# Patient Record
Sex: MALE | Race: BLACK OR AFRICAN AMERICAN | NOT HISPANIC OR LATINO | Employment: UNEMPLOYED | URBAN - METROPOLITAN AREA
[De-identification: names, ages, dates, MRNs, and addresses within clinical notes are randomized per-mention and may not be internally consistent; named-entity substitution may affect disease eponyms.]

---

## 2017-11-13 ENCOUNTER — HOSPITAL ENCOUNTER (OUTPATIENT)
Dept: RADIOLOGY | Facility: HOSPITAL | Age: 37
Discharge: HOME | End: 2017-11-13
Payer: COMMERCIAL

## 2017-11-13 ENCOUNTER — OFFICE VISIT (OUTPATIENT)
Dept: FAMILY MEDICINE | Facility: HOSPITAL | Age: 37
End: 2017-11-13
Attending: FAMILY MEDICINE
Payer: COMMERCIAL

## 2017-11-13 VITALS
SYSTOLIC BLOOD PRESSURE: 114 MMHG | WEIGHT: 164.69 LBS | HEART RATE: 68 BPM | BODY MASS INDEX: 21.83 KG/M2 | RESPIRATION RATE: 20 BRPM | DIASTOLIC BLOOD PRESSURE: 71 MMHG | HEIGHT: 73 IN
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
## 2017-11-13 DIAGNOSIS — M54.50 ACUTE BILATERAL LOW BACK PAIN WITHOUT SCIATICA: ICD-10-CM

## 2017-11-13 DIAGNOSIS — Z00.00 HEALTHCARE MAINTENANCE: Primary | ICD-10-CM

## 2017-11-13 PROCEDURE — 99214 OFFICE O/P EST MOD 30 MIN: CPT | Mod: 25

## 2017-11-13 PROCEDURE — 72110 X-RAY EXAM L-2 SPINE 4/>VWS: CPT | Mod: TC

## 2017-11-13 PROCEDURE — 72110 X-RAY EXAM L-2 SPINE 4/>VWS: CPT | Mod: 26,,, | Performed by: RADIOLOGY

## 2017-11-13 RX ORDER — CYCLOBENZAPRINE HCL 5 MG
5 TABLET ORAL 3 TIMES DAILY PRN
Qty: 30 TABLET | Refills: 0 | Status: CANCELLED | OUTPATIENT
Start: 2017-11-13 | End: 2017-11-23

## 2017-11-13 RX ORDER — NAPROXEN 500 MG/1
500 TABLET ORAL 2 TIMES DAILY WITH MEALS
Qty: 28 TABLET | Refills: 0 | Status: SHIPPED | OUTPATIENT
Start: 2017-11-13 | End: 2017-11-27
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
## 2017-11-14 NOTE — PROGRESS NOTES
I assume primary medical responsibility for this patient, I have reviewed the case history, findings, diagnosis and treatment plan with the resident and agree that the care is reasonable and necessary. This service has been performed by a resident without the presence of a teaching physician under the primary care exception  Agueda Leigh  11/14/2017

## 2017-12-05 ENCOUNTER — CLINICAL SUPPORT (OUTPATIENT)
Dept: REHABILITATION | Facility: HOSPITAL | Age: 37
End: 2017-12-05
Payer: COMMERCIAL

## 2017-12-05 DIAGNOSIS — M25.60 DECREASED RANGE OF MOTION: ICD-10-CM

## 2017-12-05 DIAGNOSIS — M54.50 CHRONIC MIDLINE LOW BACK PAIN WITHOUT SCIATICA: ICD-10-CM

## 2017-12-05 DIAGNOSIS — R53.1 DECREASED STRENGTH: ICD-10-CM

## 2017-12-05 DIAGNOSIS — G89.29 CHRONIC MIDLINE LOW BACK PAIN WITHOUT SCIATICA: ICD-10-CM

## 2017-12-05 DIAGNOSIS — R26.89 DECREASED FUNCTIONAL MOBILITY: ICD-10-CM

## 2017-12-05 PROCEDURE — 97161 PT EVAL LOW COMPLEX 20 MIN: CPT | Mod: PN
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
## 2017-12-19 ENCOUNTER — CLINICAL SUPPORT (OUTPATIENT)
Dept: REHABILITATION | Facility: HOSPITAL | Age: 37
End: 2017-12-19
Payer: COMMERCIAL

## 2017-12-19 DIAGNOSIS — G89.29 CHRONIC MIDLINE LOW BACK PAIN WITHOUT SCIATICA: ICD-10-CM

## 2017-12-19 DIAGNOSIS — R26.89 DECREASED FUNCTIONAL MOBILITY: ICD-10-CM

## 2017-12-19 DIAGNOSIS — R53.1 DECREASED STRENGTH: ICD-10-CM

## 2017-12-19 DIAGNOSIS — M54.50 CHRONIC MIDLINE LOW BACK PAIN WITHOUT SCIATICA: ICD-10-CM

## 2017-12-19 DIAGNOSIS — M25.60 DECREASED RANGE OF MOTION: ICD-10-CM

## 2017-12-19 PROCEDURE — 97110 THERAPEUTIC EXERCISES: CPT | Mod: PN

## 2017-12-19 NOTE — PROGRESS NOTES
"TIME RECORD    Date: 12/19/2017      Start Time:  4:00  Stop Time:  5:00    PROCEDURES:    TIMED  Procedure Min.   TE  30   TE supervised 25"                 UNTIMED  Procedure Min.             Total Timed Minutes:  60  Total Timed Units:  4  Total Untimed Units:  0  Charges Billed/# of units:  TE 2      Progress/Current Status    Subjective:     Patient ID: Rufino Amaral is a 37 y.o. male.  Diagnosis:   1. Decreased range of motion     2. Decreased strength     3. Decreased functional mobility     4. Chronic midline low back pain without sciatica       Pain: 0 /10  Reports partial HEP compliance    Objective:   Treatment initiated with TE per log 30 minutes f/b Sup TE 25"    Date 12/19/17   Visit 2   Gcode    FOTO    Cap visit  Cap total    MHP    MT    Stretches: HSS w/strap  Piriformis  Prone quad     3x30" B    3x30" B  3x30" B   Supine:    TAs 5" x10   Reverse crunches 2  w/SB   LTR 2' w/SB   March 2x10   Clamshell  2x10 GTB   Iso Add w/ball 2x10 w/5"hold   Bug    Bridge    SLR w/TA 2x10   Prone:    Alt LE    Alt LE/UE    Seated:    TAs    March    LAQs    Lats    Rows    UBE    Leg Press      Initials MB 1/6       Assessment:     Tolerated treatment well.     Patient Education/Response:     Verbalized understanding and demonstrated good technique following instruction    Plans and Goals:     Initial Assessment (Pertinent finding, problem list and factors affecting outcome): Pt is a 36 yo male presenting to PT with pain, decreased lumbar ROM, decreased strength, poor posture, impaired balance and gait, and functional deficits with walking. Pt would benefit from skilled PT consisting of manual therapy including STM/MFR lumbar paraspinals, QL, gluteal region/piriformis, ITB; therapeutic exercise including LE strengthening/stretching, core strengthening/stabilization, postural education, balance and gait training, and modalities prn to address limitations and increase functional mobility.              " "  History  Co-morbidities and personal factors that may impact the plan of care Examination  Body Structures and Functions, activity limitations and participation restrictions that may impact the plan of care   Clinical Presentation   Co-morbidities:               Personal Factors:   no deficits Body Regions:   head  neck  back  lower extremities  upper extremities  trunk     Body Systems:    gross symmetry  ROM  strength  balance  gait  transfers                 Participation Restrictions:   Unable to lift heavy objects at work    Activity limitations:   Learning and applying knowledge  no deficits     General Tasks and Commands  no deficits     Communication  no deficits     Mobility  lifting and carrying objects     Self care  no deficits     Domestic Life  no deficits     Interactions/Relationships  no deficits     Life Areas  no deficits     Community and Social Life  no deficits             stable and uncomplicated                                low   low   high Decision Making/ Complexity Score:  low            Rehab Potiential: good  Barriers to Rehab: none     Short Term Goals (4 Weeks): 1/5/18  1. Pt will be compliant with HEP to supplement PT in decreasing pain with functional mobility.  2. Pt will perform and hold TA contraction for 10x10" in dynamic sitting activities to demonstrate improved core strength  3. Pt will improve lumbar ROM to min loss in all planes to improve functional mobility.  4. Pt will improve impaired LE MMTs to >/=4/5 to improve strength for functional tasks.     Long Term Goals (8 Weeks): 2/5/18  1. Pt will improve FOTO score to </= 29% limited to decrease perceived limitation with maintaining/changing body position  2. Pt will improve B 9090 HS length to min decreased to improve flexibility for normal movement patterns.   3. Pt will perform and hold TA contraction for 10x10" in dynamic standing activities to demonstrate improved core strength  4. Pt will improve impaired LE MMTs " to >/=4+/5 to improve strength for functional tasks.  5. Pt will report pain </= 2/10 during lifting activities to promote functional QOL.  6. Pt will report pain </= 2/10 during lumbar ROM to promote functional mobility.             Certification Period: 12/5/17 to 2/5/18  Recommended Treatment Plan: 2 times per week for 8 weeks: Gait Training, Group Therapy, Locomotor Training, Manual Therapy, Moist Heat/ Ice, Neuromuscular Re-ed, Patient Education, Therapeutic Activites and Therapeutic Exercise  Other Recommendations: modalities prn, ASTYM prn, kinesiotape prn, Functional Dry Needling prn

## 2017-12-26 ENCOUNTER — CLINICAL SUPPORT (OUTPATIENT)
Dept: REHABILITATION | Facility: HOSPITAL | Age: 37
End: 2017-12-26
Payer: COMMERCIAL

## 2017-12-26 DIAGNOSIS — M54.50 CHRONIC MIDLINE LOW BACK PAIN WITHOUT SCIATICA: ICD-10-CM

## 2017-12-26 DIAGNOSIS — R53.1 DECREASED STRENGTH: ICD-10-CM

## 2017-12-26 DIAGNOSIS — G89.29 CHRONIC MIDLINE LOW BACK PAIN WITHOUT SCIATICA: ICD-10-CM

## 2017-12-26 DIAGNOSIS — R26.89 DECREASED FUNCTIONAL MOBILITY: ICD-10-CM

## 2017-12-26 DIAGNOSIS — M25.60 DECREASED RANGE OF MOTION: ICD-10-CM

## 2017-12-26 PROCEDURE — 97110 THERAPEUTIC EXERCISES: CPT | Mod: PN

## 2017-12-26 NOTE — PROGRESS NOTES
"TIME RECORD    Date: 12/26/2017      Start Time:  4:10  Stop Time:  5:00    PROCEDURES:    TIMED  Procedure Min.   TE  50   TE supervised                  UNTIMED  Procedure Min.             Total Timed Minutes:  50  Total Timed Units:  3  Total Untimed Units:  0  Charges Billed/# of units:  TE 3      Progress/Current Status    Subjective:     Patient ID: Rufino Amaral is a 37 y.o. male.  Diagnosis:   1. Decreased range of motion     2. Decreased strength     3. Decreased functional mobility     4. Chronic midline low back pain without sciatica       Pain: 4-5/10  Pt reports that his pain is more annoying and that it depends of the movements he makes.    Objective:   Treatment initiated with TE per log below 1:1 with PT x 50 mins.    Date 12/26/17 12/19/17   Visit 3 2   FOTO 3/5    MHP     MT     Stretches: HSS w/strap  Piriformis  Prone quad     3x30'' B    3x30'' B     3x30" B    3x30" B  3x30" B   Supine:     TAs 5''x10 5" x10   Reverse crunches 2' w/SB 2  w/SB   LTR 2' w/SB 2' w/SB   March 2x10 w/ TA 2x10   Clamshell  2x10 GTB 2x10 GTB   Iso Add w/ball 15x5'' 2x10 w/5"hold   Bug     Bridge 2x15    SLR w/TA 2x10 2x10   Prone:     Alt LE     Alt LE/UE     Seated:     TAs     March     LAQs     Lats Next    Rows Next    UBE     Leg Press 2x10 DL  5 plates      Initials JL MB 1/6       Assessment:     Pt reported minimal increase to no increase in low back pain during Te, and tolerated added TE bridges and leg press well today. Pt reported 3-4/10 low back pain at end of session that is still more activity related.    Patient Education/Response:     Verbalized understanding and demonstrated good technique following instruction    Plans and Goals:   Cont per POC, progress per pt tolerance    Short Term Goals (4 Weeks): 1/5/18  1. Pt will be compliant with HEP to supplement PT in decreasing pain with functional mobility.  2. Pt will perform and hold TA contraction for 10x10" in dynamic sitting activities to " "demonstrate improved core strength  3. Pt will improve lumbar ROM to min loss in all planes to improve functional mobility.  4. Pt will improve impaired LE MMTs to >/=4/5 to improve strength for functional tasks.     Long Term Goals (8 Weeks): 2/5/18  1. Pt will improve FOTO score to </= 29% limited to decrease perceived limitation with maintaining/changing body position  2. Pt will improve B 9090 HS length to min decreased to improve flexibility for normal movement patterns.   3. Pt will perform and hold TA contraction for 10x10" in dynamic standing activities to demonstrate improved core strength  4. Pt will improve impaired LE MMTs to >/=4+/5 to improve strength for functional tasks.  5. Pt will report pain </= 2/10 during lifting activities to promote functional QOL.  6. Pt will report pain </= 2/10 during lumbar ROM to promote functional mobility.    "

## 2017-12-27 ENCOUNTER — DOCUMENTATION ONLY (OUTPATIENT)
Dept: REHABILITATION | Facility: HOSPITAL | Age: 37
End: 2017-12-27

## 2017-12-27 DIAGNOSIS — R53.1 DECREASED STRENGTH: ICD-10-CM

## 2017-12-27 DIAGNOSIS — G89.29 CHRONIC MIDLINE LOW BACK PAIN WITHOUT SCIATICA: ICD-10-CM

## 2017-12-27 DIAGNOSIS — R26.89 DECREASED FUNCTIONAL MOBILITY: ICD-10-CM

## 2017-12-27 DIAGNOSIS — M54.50 CHRONIC MIDLINE LOW BACK PAIN WITHOUT SCIATICA: ICD-10-CM

## 2017-12-27 DIAGNOSIS — M25.60 DECREASED RANGE OF MOTION: ICD-10-CM

## 2017-12-27 NOTE — PROGRESS NOTES
Face to Face PTA Conference performed with Kaykay Aquino PTA, Brunilda Krishnamurthy PTA, John Ha PTA Villa Diaz PTA regarding patient's current status, overall progress, and plan of care    Leslie Gracia, PT     Face to face meeting completed with Leslie Gracia  PT regarding current status and progress of   Rufinokateryna Amaral .  Villa Diaz, PTA     Face to face meeting completed with Leslie Gracia PT regarding current status and progress of   Rufinokateryna Amaral .  Brunilda Krishnamurthy, PTA    Face to face meeting completed with Leslie Gracia PT regarding current status and progress of   Rufinokateryna Amaral .  John Ha PTA      Face to face meeting completed with Leslie Gracia PT regarding current status and progress of   Rufinokateryna Amaral .  Tawana Aquino, PTA

## 2017-12-28 ENCOUNTER — CLINICAL SUPPORT (OUTPATIENT)
Dept: REHABILITATION | Facility: HOSPITAL | Age: 37
End: 2017-12-28
Payer: COMMERCIAL

## 2017-12-28 DIAGNOSIS — M54.50 CHRONIC MIDLINE LOW BACK PAIN WITHOUT SCIATICA: ICD-10-CM

## 2017-12-28 DIAGNOSIS — G89.29 CHRONIC MIDLINE LOW BACK PAIN WITHOUT SCIATICA: ICD-10-CM

## 2017-12-28 DIAGNOSIS — R53.1 DECREASED STRENGTH: ICD-10-CM

## 2017-12-28 DIAGNOSIS — R26.89 DECREASED FUNCTIONAL MOBILITY: ICD-10-CM

## 2017-12-28 DIAGNOSIS — M25.60 DECREASED RANGE OF MOTION: ICD-10-CM

## 2017-12-28 PROCEDURE — 97110 THERAPEUTIC EXERCISES: CPT | Mod: PN

## 2017-12-28 NOTE — PROGRESS NOTES
"TIME RECORD    Date: 12/28/2017      Start Time:  4:00  Stop Time:  4:55    PROCEDURES:    TIMED  Procedure Min.   TE  55   TE supervised                  UNTIMED  Procedure Min.             Total Timed Minutes:  55  Total Timed Units:  4  Total Untimed Units:  0  Charges Billed/# of units:  TE 4      Progress/Current Status    Subjective:     Patient ID: Rufino Amaral is a 37 y.o. male.  Diagnosis:   1. Decreased range of motion     2. Decreased strength     3. Decreased functional mobility     4. Chronic midline low back pain without sciatica       Pain: 0 /10  Pt reports that his pain is more annoying and that it depends of the movements he makes. Expressed that he is making progress.     Objective:   Treatment initiated with TE per log below 1:1 with PT x 50 mins.    Date 12/28/17 12/26/17 12/19/17   Visit 4 3 2   FOTO 4/5 3/5 2/5   MHP      MT      Stretches: HSS w/strap  Piriformis  Prone quad     3x30" B    3x30" B  3x30" B   3x30'' B    3x30'' B     3x30" B    3x30" B  3x30" B   Supine:      TAs 5" x 20 5''x10 5" x10   Reverse crunches 2' w/Gr SB 2' w/SB 2  w/SB   LTR 2' w/ Gr SB 2' w/SB 2' w/SB   March w/TA 2x10 B 2# 2x10 w/ TA 2x10   Clamshell  2x10 BTB 2x10 GTB 2x10 GTB   Iso Add w/ball 5" x20 15x5'' 2x10 w/5"hold   Bug      Bridge 2x15 B 2x15 B    SLR w/TA 2x15 B 2x10 B 2x10   Prone:      Alt LE 2x10 B     Alt LE/UE 2x10 B     Seated:      TAs      March      LAQs      Lats 2x10 BTB Next    Rows 2x15 BTB Next    UBE      Leg Press  2x10 DL  5 plates      Initials MB 1/6 JL MB 1/6       Assessment:     Pt reported no  increase to low back pain during treatment today. Increased reps and resistance per log. . Pt reported  No low back pain at end of session.    Patient Education/Response:     Verbalized understanding and demonstrated good technique following instruction    Plans and Goals:   Cont per POC, progress per pt tolerance    Short Term Goals (4 Weeks): 1/5/18  1. Pt will be compliant with HEP to " "supplement PT in decreasing pain with functional mobility.  2. Pt will perform and hold TA contraction for 10x10" in dynamic sitting activities to demonstrate improved core strength  3. Pt will improve lumbar ROM to min loss in all planes to improve functional mobility.  4. Pt will improve impaired LE MMTs to >/=4/5 to improve strength for functional tasks.     Long Term Goals (8 Weeks): 2/5/18  1. Pt will improve FOTO score to </= 29% limited to decrease perceived limitation with maintaining/changing body position  2. Pt will improve B 9090 HS length to min decreased to improve flexibility for normal movement patterns.   3. Pt will perform and hold TA contraction for 10x10" in dynamic standing activities to demonstrate improved core strength  4. Pt will improve impaired LE MMTs to >/=4+/5 to improve strength for functional tasks.  5. Pt will report pain </= 2/10 during lifting activities to promote functional QOL.  6. Pt will report pain </= 2/10 during lumbar ROM to promote functional mobility.    "

## 2018-01-02 ENCOUNTER — CLINICAL SUPPORT (OUTPATIENT)
Dept: REHABILITATION | Facility: HOSPITAL | Age: 38
End: 2018-01-02
Payer: MEDICAID

## 2018-01-02 DIAGNOSIS — R26.89 DECREASED FUNCTIONAL MOBILITY: ICD-10-CM

## 2018-01-02 DIAGNOSIS — M25.60 DECREASED RANGE OF MOTION: ICD-10-CM

## 2018-01-02 DIAGNOSIS — M54.50 CHRONIC MIDLINE LOW BACK PAIN WITHOUT SCIATICA: ICD-10-CM

## 2018-01-02 DIAGNOSIS — G89.29 CHRONIC MIDLINE LOW BACK PAIN WITHOUT SCIATICA: ICD-10-CM

## 2018-01-02 DIAGNOSIS — R53.1 DECREASED STRENGTH: ICD-10-CM

## 2018-01-02 PROCEDURE — 97110 THERAPEUTIC EXERCISES: CPT | Mod: PN

## 2018-01-02 NOTE — PROGRESS NOTES
"TIME RECORD    Date: 1/2/2018      Start Time:  5:00  Stop Time:  6:00    PROCEDURES:    TIMED  Procedure Min.   TE  55                     UNTIMED  Procedure Min.             Total Timed Minutes:  55  Total Timed Units:  4  Total Untimed Units:  0  Charges Billed/# of units:  TE 4      Progress/Current Status    Subjective:     Patient ID: Rufino Amaral is a 37 y.o. male.  Diagnosis:   1. Decreased range of motion     2. Decreased strength     3. Decreased functional mobility     4. Chronic midline low back pain without sciatica       Pain: 4/10 pt reports pain in B mid lower back described as tingling. Pt reports he has not been doing heavy activity     Objective:   Pt completed all therex as per log below 1:1 with PTA x 55 mins.    Date 1/2/18 12/28/17 12/26/17 12/19/17   Visit 5 4 3 2   FOTO 5/10 4/5 3/5 2/5   MHP       MT       Stretches: HSS w/strap  Piriformis  Prone quad   3x30" B      3x30" B  3x30" B   3x30" B    3x30" B  3x30" B   3x30'' B    3x30'' B     3x30" B    3x30" B  3x30" B   Supine:       TAs 5"x20 5" x 20 5''x10 5" x10   Reverse crunches  2' w/Gr SB 2' w/SB 2  w/SB   LTR 2' w/ Gr SB 2' w/ Gr SB 2' w/SB 2' w/SB   March w/TA  2x10 B 2# 2x10 w/ TA 2x10   Clamshell  2x10 BTB 2x10 BTB 2x10 GTB 2x10 GTB   Iso Add w/ball 5" x20 5" x20 15x5'' 2x10 w/5"hold   Bug       Bridge 2x15 B 2x15 B 2x15 B    SLR w/TA 2x15 B 2x15 B 2x10 B 2x10   Prone:       Alt LE 2x10 B 2x10 B     Alt LE/UE 2x10 B 2x10 B     Seated:       TAs       March       LAQs       Lats 2x10 BTB 2x10 BTB Next    Rows 2x10 BTB 2x15 BTB Next    UBE       Leg Press   2x10 DL  5 plates      Initials JA 2/6 MB 1/6 JL MB 1/6       Assessment:     Pt able to complete session with no reports of increased pain. Instructed patient to become more aware of his posture in standing and sitting position.     Patient Education/Response:     Pt advised to cont HEP within tolerance    Plans and Goals:   Cont per POC, progress per pt tolerance    Short " "Term Goals (4 Weeks): 1/5/18  1. Pt will be compliant with HEP to supplement PT in decreasing pain with functional mobility.  2. Pt will perform and hold TA contraction for 10x10" in dynamic sitting activities to demonstrate improved core strength  3. Pt will improve lumbar ROM to min loss in all planes to improve functional mobility.  4. Pt will improve impaired LE MMTs to >/=4/5 to improve strength for functional tasks.     Long Term Goals (8 Weeks): 2/5/18  1. Pt will improve FOTO score to </= 29% limited to decrease perceived limitation with maintaining/changing body position  2. Pt will improve B 9090 HS length to min decreased to improve flexibility for normal movement patterns.   3. Pt will perform and hold TA contraction for 10x10" in dynamic standing activities to demonstrate improved core strength  4. Pt will improve impaired LE MMTs to >/=4+/5 to improve strength for functional tasks.  5. Pt will report pain </= 2/10 during lifting activities to promote functional QOL.  6. Pt will report pain </= 2/10 during lumbar ROM to promote functional mobility.    "

## 2018-01-04 ENCOUNTER — CLINICAL SUPPORT (OUTPATIENT)
Dept: REHABILITATION | Facility: HOSPITAL | Age: 38
End: 2018-01-04
Payer: MEDICAID

## 2018-01-04 DIAGNOSIS — R53.1 DECREASED STRENGTH: ICD-10-CM

## 2018-01-04 DIAGNOSIS — G89.29 CHRONIC MIDLINE LOW BACK PAIN WITHOUT SCIATICA: ICD-10-CM

## 2018-01-04 DIAGNOSIS — M54.50 CHRONIC MIDLINE LOW BACK PAIN WITHOUT SCIATICA: ICD-10-CM

## 2018-01-04 DIAGNOSIS — M25.60 DECREASED RANGE OF MOTION: ICD-10-CM

## 2018-01-04 DIAGNOSIS — R26.89 DECREASED FUNCTIONAL MOBILITY: ICD-10-CM

## 2018-01-04 PROCEDURE — 97110 THERAPEUTIC EXERCISES: CPT | Mod: PN

## 2018-01-04 NOTE — PROGRESS NOTES
"TIME RECORD    Date: 1/4/2018      Start Time:  0406  Stop Time:  0455    PROCEDURES:    TIMED  Procedure Min.   TE 51                     UNTIMED  Procedure Min.             Total Timed Minutes:  51  Total Timed Units:  3  Total Untimed Units:  0  Charges Billed/# of units:  3TE      Progress/Current Status    Subjective:     Patient ID: Rufino Amaral is a 37 y.o. male.  Diagnosis:   1. Decreased range of motion     2. Decreased strength     3. Decreased functional mobility     4. Chronic midline low back pain without sciatica       Pain: 2 /10  Pt stated that this pain level is normal for him.     Objective:     Pt performed TE x 51' per log.  Added TE noted in log.    Date 1/4/18 1/2/18 12/28/17 12/26/17 12/19/17   Visit 6 5 4 3 2   FOTO 6/10 5/10 4/5 3/5 2/5   MHP --       MT --       Stretches: HSS w/strap  Piriformis  Prone quad   3x30" B      3x30" B  3x30" B 3x30" B      3x30" B  3x30" B   3x30" B    3x30" B  3x30" B   3x30'' B    3x30'' B     3x30" B    3x30" B  3x30" B   Supine:        TAs 5"x20 5"x20 5" x 20 5''x10 5" x10   Reverse crunches 2' w/Gr SB  2' w/Gr SB 2' w/SB 2  w/SB   LTR 2' w/ Gr SB 2' w/ Gr SB 2' w/ Gr SB 2' w/SB 2' w/SB   March w/TA   2x10 B 2# 2x10 w/ TA 2x10   Clamshell  2x10 BTB 2x10 BTB 2x10 BTB 2x10 GTB 2x10 GTB   Iso Add w/ball 5" x20 5" x20 5" x20 15x5'' 2x10 w/5"hold   Bug --       Bridge 2x15 B 2x15 B 2x15 B 2x15 B    SLR w/TA 2x15 B 2x15 B 2x15 B 2x10 B 2x10   Prone:        Alt LE 2x10 B 2x10 B 2x10 B     Alt LE/UE 2x10 B 2x10 B 2x10 B     Seated:        TAs --       March --       LAQs --       Lats 2x15 BTB 2x10 BTB 2x10 BTB Next    Rows 2x15 BTB 2x10 BTB 2x15 BTB Next    UBE --       Leg Press 2x15 DL  6 plates   2x10 DL  5 plates    steamboats 2 x 10 B on green         Initials FS ZULEYMA 2/6 MB 1/6 JL CEBALLOS 1/6       Assessment:     Pt able to complete session with no reports of increased pain. Instructed patient to become more aware of his posture in standing and sitting " "position.     Patient Education/Response:     CONT HEP    Plans and Goals:     Cont per POC, progress per pt tolerance    Short Term Goals (4 Weeks): 1/5/18  1. Pt will be compliant with HEP to supplement PT in decreasing pain with functional mobility.  2. Pt will perform and hold TA contraction for 10x10" in dynamic sitting activities to demonstrate improved core strength  3. Pt will improve lumbar ROM to min loss in all planes to improve functional mobility.  4. Pt will improve impaired LE MMTs to >/=4/5 to improve strength for functional tasks.     Long Term Goals (8 Weeks): 2/5/18  1. Pt will improve FOTO score to </= 29% limited to decrease perceived limitation with maintaining/changing body position  2. Pt will improve B 9090 HS length to min decreased to improve flexibility for normal movement patterns.   3. Pt will perform and hold TA contraction for 10x10" in dynamic standing activities to demonstrate improved core strength  4. Pt will improve impaired LE MMTs to >/=4+/5 to improve strength for functional tasks.  5. Pt will report pain </= 2/10 during lifting activities to promote functional QOL.  6. Pt will report pain </= 2/10 during lumbar ROM to promote functional mobility.      "

## 2018-01-11 ENCOUNTER — CLINICAL SUPPORT (OUTPATIENT)
Dept: REHABILITATION | Facility: HOSPITAL | Age: 38
End: 2018-01-11
Payer: MEDICAID

## 2018-01-11 DIAGNOSIS — R26.89 DECREASED FUNCTIONAL MOBILITY: ICD-10-CM

## 2018-01-11 DIAGNOSIS — G89.29 CHRONIC MIDLINE LOW BACK PAIN WITHOUT SCIATICA: ICD-10-CM

## 2018-01-11 DIAGNOSIS — M54.50 CHRONIC MIDLINE LOW BACK PAIN WITHOUT SCIATICA: ICD-10-CM

## 2018-01-11 DIAGNOSIS — M25.60 DECREASED RANGE OF MOTION: ICD-10-CM

## 2018-01-11 DIAGNOSIS — R53.1 DECREASED STRENGTH: ICD-10-CM

## 2018-01-11 PROCEDURE — 97110 THERAPEUTIC EXERCISES: CPT | Mod: PN

## 2018-01-11 NOTE — PROGRESS NOTES
"TIME RECORD    Date: 1/11/2018      Start Time:  355  Stop Time:  505    PROCEDURES:    TIMED  Procedure Min.   Therex 70         Total Timed Minutes:  70  Total Timed Units:  5  Total Untimed Units:  0  Charges Billed/# of units:  5 TE      Progress/Current Status    Subjective:     Patient ID: Rufino Amaral is a 37 y.o. male.  Diagnosis:   1. Decreased range of motion     2. Decreased strength     3. Decreased functional mobility     4. Chronic midline low back pain without sciatica       Patient reports "no pain right now", feels his flexibility and function has improved.    Objective:     Pt performed TE x 70' per log.  Increased reps and expanded core stabilization program as noted.    Date 1/11/18 1/4/18 1/2/18 12/28/17 12/26/17 12/19/17   Visit 7 6 5 4 3 2   FOTO 7/10 6/10 5/10 4/5 3/5 2/5   MHP -- --       MT -- --       Stretches: HSS w/strap  Piriformis  Prone quad       30"x3 B  30"x3 B  30"x3 B     3x30" B      3x30" B  3x30" B 3x30" B      3x30" B  3x30" B   3x30" B    3x30" B  3x30" B   3x30'' B    3x30'' B     3x30" B    3x30" B  3x30" B   Supine:         TAs 10"x10 5"x20 5"x20 5" x 20 5''x10 5" x10   Reverse crunches 2' w/Red SB 2' w/Gr SB  2' w/Gr SB 2' w/SB 2  w/SB   LTR 2' w/red SB 2' w/ Gr SB 2' w/ Gr SB 2' w/ Gr SB 2' w/SB 2' w/SB   March w/TA    2x10 B 2# 2x10 w/ TA 2x10   Clamshell  2x10 BTB 2x10 BTB 2x10 BTB 2x10 BTB 2x10 GTB 2x10 GTB   Iso Add w/ball w/bridges 5" x20 5" x20 5" x20 15x5'' 2x10 w/5"hold   Bug -- --       Bridge 3" 2x12 w/ball 2x15 B 2x15 B 2x15 B 2x15 B    SLR w/TA 2# 2x10 B 2x15 B 2x15 B 2x15 B 2x10 B 2x10   Prone:         Alt LE 2x10 B 2x10 B 2x10 B 2x10 B     Alt LE/UE 2x12 B 2x10 B 2x10 B 2x10 B     Quadruped         Alt LE 1x10        Seated:         TAs -- --       March -- --       LAQs -- --       Lats BTC 2x15  2x15 BTB 2x10 BTB 2x10 BTB Next    Rows BTX 2x15 2x15 BTB 2x10 BTB 2x15 BTB Next    UBE  --       Leg Press 7.0 DL  2x15  2x15 DL  6 plates   2x10 DL  5 " "plates    steamboats RTC 2x10 B  On grn pad 2 x 10 B on green         Initials DH 1/6 FS JA 2/6 MB 1/6 JL MB 1/6       Assessment:     Patient able to increase activity with added reps/weight and expanded core stabilization in quadruped today.  Reports no complaints after session.  "That was a good workout."    Patient Education/Response:     Patient educated to continue HEP.  Verbalized understanding. Instructed in log roll technique for supine <> sit.  Practiced same and demonstrated understanding.    Plans and Goals:     Cont per POC, progress per pt tolerance    Short Term Goals (4 Weeks): 1/5/18  1. Pt will be compliant with HEP to supplement PT in decreasing pain with functional mobility.  2. Pt will perform and hold TA contraction for 10x10" in dynamic sitting activities to demonstrate improved core strength  3. Pt will improve lumbar ROM to min loss in all planes to improve functional mobility.  4. Pt will improve impaired LE MMTs to >/=4/5 to improve strength for functional tasks.     Long Term Goals (8 Weeks): 2/5/18  1. Pt will improve FOTO score to </= 29% limited to decrease perceived limitation with maintaining/changing body position  2. Pt will improve B 9090 HS length to min decreased to improve flexibility for normal movement patterns.   3. Pt will perform and hold TA contraction for 10x10" in dynamic standing activities to demonstrate improved core strength  4. Pt will improve impaired LE MMTs to >/=4+/5 to improve strength for functional tasks.  5. Pt will report pain </= 2/10 during lifting activities to promote functional QOL.  6. Pt will report pain </= 2/10 during lumbar ROM to promote functional mobility.        "

## 2018-01-15 ENCOUNTER — CLINICAL SUPPORT (OUTPATIENT)
Dept: REHABILITATION | Facility: HOSPITAL | Age: 38
End: 2018-01-15
Payer: MEDICAID

## 2018-01-15 DIAGNOSIS — M54.50 CHRONIC MIDLINE LOW BACK PAIN WITHOUT SCIATICA: ICD-10-CM

## 2018-01-15 DIAGNOSIS — R26.89 DECREASED FUNCTIONAL MOBILITY: ICD-10-CM

## 2018-01-15 DIAGNOSIS — M25.60 DECREASED RANGE OF MOTION: ICD-10-CM

## 2018-01-15 DIAGNOSIS — G89.29 CHRONIC MIDLINE LOW BACK PAIN WITHOUT SCIATICA: ICD-10-CM

## 2018-01-15 DIAGNOSIS — R53.1 DECREASED STRENGTH: ICD-10-CM

## 2018-01-15 PROCEDURE — 97110 THERAPEUTIC EXERCISES: CPT | Mod: PN

## 2018-01-15 NOTE — PROGRESS NOTES
"TIME RECORD    Date: 1/15/2018      Start Time:  345  Stop Time:  450    PROCEDURES:    TIMED  Procedure Min.   Therex 65         Total Timed Minutes:  65  Total Timed Units:  4  Total Untimed Units:  0  Charges Billed/# of units:  4 TE      Progress/Current Status    Subjective:     Patient ID: Rufino Amaral is a 37 y.o. male.  Diagnosis:   1. Decreased range of motion     2. Decreased strength     3. Decreased functional mobility     4. Chronic midline low back pain without sciatica       Patient reports "very minor" pain right now.    Objective:     Pt performed TE 1:1 with PTA  x 65 minutes per log.      Date 1/15/18 1/11/18 1/4/18 1/2/18 12/28/17 12/26/17 12/19/17   Visit 8 7 6 5 4 3 2   FOTO 8/10 7/10 6/10 5/10 4/5 3/5 2/5   MHP -- -- --       MT -- -- --       Stretches: HSS w/strap  Piriformis  Prone quad     30"x3 B  30"x3 B  30"x3 B   30"x3 B  30"x3 B  30"x3 B     3x30" B      3x30" B  3x30" B 3x30" B      3x30" B  3x30" B   3x30" B    3x30" B  3x30" B   3x30'' B    3x30'' B     3x30" B    3x30" B  3x30" B   Supine:          TAs 10"x10 10"x10 5"x20 5"x20 5" x 20 5''x10 5" x10   Reverse crunches 2' w/Red SB 2' w/Red SB 2' w/Gr SB  2' w/Gr SB 2' w/SB 2  w/SB   LTR 2' w/Red SB 2' w/red SB 2' w/ Gr SB 2' w/ Gr SB 2' w/ Gr SB 2' w/SB 2' w/SB   March w/TA     2x10 B 2# 2x10 w/ TA 2x10   Clamshell  2x12  BTB 2x10 BTB 2x10 BTB 2x10 BTB 2x10 BTB 2x10 GTB 2x10 GTB   Iso Add w/ball w/bridges w/bridges 5" x20 5" x20 5" x20 15x5'' 2x10 w/5"hold   Bug 2# 2x10 -- --       Bridge 3" 2x12 w/ball 3" 2x12 w/ball 2x15 B 2x15 B 2x15 B 2x15 B    SLR w/TA 2#  2x12 B 2# 2x10 B 2x15 B 2x15 B 2x15 B 2x10 B 2x10   Prone:          Alt LE 2x12 B 2x10 B 2x10 B 2x10 B 2x10 B     Alt LE/UE 2x12 B 2x12 B 2x10 B 2x10 B 2x10 B     Quadruped          Alt LE 2x10 1x10        Seated:          TAs - -- --       March - -- --       LAQs - -- --       Lats BTC 2x15 BTC 2x15  2x15 BTB 2x10 BTB 2x10 BTB Next    Rows BTC 2x15 BTC 2x15 2x15 BTB " "2x10 BTB 2x15 BTB Next    UBE   --       Leg Press 7.0  DL  2x15 7.0 DL  2x15  2x15 DL  6 plates   2x10 DL  5 plates    steamboats RTC 2x12 B  On grn pad RTC 2x10 B  On grn pad 2 x 10 B on green         Initials CS  2/6 DH 1/6 FS JA 2/6 MB 1/6 JL MB 1/6       Assessment:     Patient reported feeling good at end of session.    Patient Education/Response:     Patient educated to continue HEP.      Plans and Goals:     Cont per POC, progress per pt tolerance    Short Term Goals (4 Weeks): 1/5/18  1. Pt will be compliant with HEP to supplement PT in decreasing pain with functional mobility.  2. Pt will perform and hold TA contraction for 10x10" in dynamic sitting activities to demonstrate improved core strength  3. Pt will improve lumbar ROM to min loss in all planes to improve functional mobility.  4. Pt will improve impaired LE MMTs to >/=4/5 to improve strength for functional tasks.     Long Term Goals (8 Weeks): 2/5/18  1. Pt will improve FOTO score to </= 29% limited to decrease perceived limitation with maintaining/changing body position  2. Pt will improve B 9090 HS length to min decreased to improve flexibility for normal movement patterns.   3. Pt will perform and hold TA contraction for 10x10" in dynamic standing activities to demonstrate improved core strength  4. Pt will improve impaired LE MMTs to >/=4+/5 to improve strength for functional tasks.  5. Pt will report pain </= 2/10 during lifting activities to promote functional QOL.  6. Pt will report pain </= 2/10 during lumbar ROM to promote functional mobility.        "

## 2018-02-07 ENCOUNTER — TELEPHONE (OUTPATIENT)
Dept: REHABILITATION | Facility: HOSPITAL | Age: 38
End: 2018-02-07

## 2018-02-07 DIAGNOSIS — R26.89 DECREASED FUNCTIONAL MOBILITY: ICD-10-CM

## 2018-02-07 DIAGNOSIS — G89.29 CHRONIC MIDLINE LOW BACK PAIN WITHOUT SCIATICA: ICD-10-CM

## 2018-02-07 DIAGNOSIS — R53.1 DECREASED STRENGTH: ICD-10-CM

## 2018-02-07 DIAGNOSIS — M25.60 DECREASED RANGE OF MOTION: ICD-10-CM

## 2018-02-07 DIAGNOSIS — M54.50 CHRONIC MIDLINE LOW BACK PAIN WITHOUT SCIATICA: ICD-10-CM

## 2018-02-07 NOTE — TELEPHONE ENCOUNTER
"Spoke with patient regarding his current status. Patient stated that he is feeling "the same" as before beginning therapy. He also stated, "I don't think therapy helped much." PT encouraged patient return to [facility] for follow-up for continued pain. Patient verbalized understanding. Patient then requested billing information for his physical therapy visits that he could give to his insurance company. PT then gave patient the number to [facility] Billing Department. Patient was instructed to call clinic if he has any questions or concerns and pt verbalized understanding.  "

## 2018-06-05 ENCOUNTER — DOCUMENTATION ONLY (OUTPATIENT)
Dept: REHABILITATION | Facility: HOSPITAL | Age: 38
End: 2018-06-05

## 2018-06-05 DIAGNOSIS — R53.1 DECREASED STRENGTH: ICD-10-CM

## 2018-06-05 DIAGNOSIS — M54.50 CHRONIC MIDLINE LOW BACK PAIN WITHOUT SCIATICA: ICD-10-CM

## 2018-06-05 DIAGNOSIS — G89.29 CHRONIC MIDLINE LOW BACK PAIN WITHOUT SCIATICA: ICD-10-CM

## 2018-06-05 DIAGNOSIS — M25.60 DECREASED RANGE OF MOTION: ICD-10-CM

## 2018-06-05 DIAGNOSIS — R26.89 DECREASED FUNCTIONAL MOBILITY: ICD-10-CM

## 2018-06-05 NOTE — PROGRESS NOTES
Pt was evaluated on 12/5/17 and was seen 8 times for PT. Pt has not attended PT since 1/15/18. Pt was given HEP. Current status is unknown. Pt to be d/c'd at this time.